# Patient Record
Sex: FEMALE | URBAN - METROPOLITAN AREA
[De-identification: names, ages, dates, MRNs, and addresses within clinical notes are randomized per-mention and may not be internally consistent; named-entity substitution may affect disease eponyms.]

---

## 2021-03-05 ENCOUNTER — APPOINTMENT (RX ONLY)
Dept: URBAN - METROPOLITAN AREA CLINIC 31 | Facility: CLINIC | Age: 36
Setting detail: DERMATOLOGY
End: 2021-03-05

## 2021-03-05 DIAGNOSIS — L21.8 OTHER SEBORRHEIC DERMATITIS: ICD-10-CM

## 2021-03-05 DIAGNOSIS — L85.3 XEROSIS CUTIS: ICD-10-CM

## 2021-03-05 DIAGNOSIS — Z71.89 OTHER SPECIFIED COUNSELING: ICD-10-CM

## 2021-03-05 DIAGNOSIS — D22 MELANOCYTIC NEVI: ICD-10-CM

## 2021-03-05 PROBLEM — D22.62 MELANOCYTIC NEVI OF LEFT UPPER LIMB, INCLUDING SHOULDER: Status: ACTIVE | Noted: 2021-03-05

## 2021-03-05 PROCEDURE — ? ADDITIONAL NOTES

## 2021-03-05 PROCEDURE — 99203 OFFICE O/P NEW LOW 30 MIN: CPT

## 2021-03-05 PROCEDURE — ? PRESCRIPTION

## 2021-03-05 PROCEDURE — ? COUNSELING

## 2021-03-05 RX ORDER — TRIAMCINOLONE ACETONIDE 1 MG/G
CREAM TOPICAL
Qty: 1 | Refills: 2 | Status: ERX | COMMUNITY
Start: 2021-03-05

## 2021-03-05 RX ADMIN — TRIAMCINOLONE ACETONIDE: 1 CREAM TOPICAL at 00:00

## 2021-03-05 ASSESSMENT — LOCATION SIMPLE DESCRIPTION DERM
LOCATION SIMPLE: LEFT EAR
LOCATION SIMPLE: ABDOMEN
LOCATION SIMPLE: LEFT POSTERIOR UPPER ARM
LOCATION SIMPLE: RIGHT EAR

## 2021-03-05 ASSESSMENT — LOCATION DETAILED DESCRIPTION DERM
LOCATION DETAILED: LEFT CYMBA CONCHA
LOCATION DETAILED: EPIGASTRIC SKIN
LOCATION DETAILED: LEFT DISTAL POSTERIOR UPPER ARM
LOCATION DETAILED: RIGHT CYMBA CONCHA

## 2021-03-05 ASSESSMENT — LOCATION ZONE DERM
LOCATION ZONE: EAR
LOCATION ZONE: TRUNK
LOCATION ZONE: ARM

## 2021-03-05 NOTE — PROCEDURE: ADDITIONAL NOTES
Render Risk Assessment In Note?: no
Additional Notes: Neutrogena Norwegian hand cream, Cerave body cream, especially after swimming or hot tub.
Detail Level: Simple

## 2021-03-05 NOTE — HPI: EVALUATION OF SKIN LESION(S)
What Type Of Note Output Would You Prefer (Optional)?: Standard Output
Have Your Spot(S) Been Treated In The Past?: has not been treated
Hpi Title: Evaluation of a Skin Lesion
Additional History: Patient states that it has been there all her life.
Year Removed: 1900

## 2022-07-21 ENCOUNTER — GYNECOLOGY VISIT (OUTPATIENT)
Dept: OBGYN | Facility: CLINIC | Age: 37
End: 2022-07-21
Payer: COMMERCIAL

## 2022-07-21 VITALS — WEIGHT: 225 LBS | SYSTOLIC BLOOD PRESSURE: 126 MMHG | DIASTOLIC BLOOD PRESSURE: 83 MMHG

## 2022-07-21 DIAGNOSIS — N39.3 SUI (STRESS URINARY INCONTINENCE, FEMALE): ICD-10-CM

## 2022-07-21 LAB
APPEARANCE UR: CLEAR
BILIRUB UR STRIP-MCNC: NORMAL MG/DL
COLOR UR AUTO: YELLOW
GLUCOSE UR STRIP.AUTO-MCNC: NEGATIVE MG/DL
KETONES UR STRIP.AUTO-MCNC: NORMAL MG/DL
LEUKOCYTE ESTERASE UR QL STRIP.AUTO: NEGATIVE
NITRITE UR QL STRIP.AUTO: NEGATIVE
PH UR STRIP.AUTO: 5.5 [PH] (ref 5–8)
PROT UR QL STRIP: NORMAL MG/DL
RBC UR QL AUTO: NORMAL
SP GR UR STRIP.AUTO: >=1.03
UROBILINOGEN UR STRIP-MCNC: NORMAL MG/DL

## 2022-07-21 PROCEDURE — 81002 URINALYSIS NONAUTO W/O SCOPE: CPT | Performed by: STUDENT IN AN ORGANIZED HEALTH CARE EDUCATION/TRAINING PROGRAM

## 2022-07-21 PROCEDURE — 51725 SIMPLE CYSTOMETROGRAM: CPT | Performed by: STUDENT IN AN ORGANIZED HEALTH CARE EDUCATION/TRAINING PROGRAM

## 2022-07-21 PROCEDURE — 99204 OFFICE O/P NEW MOD 45 MIN: CPT | Mod: 25 | Performed by: STUDENT IN AN ORGANIZED HEALTH CARE EDUCATION/TRAINING PROGRAM

## 2022-07-21 RX ORDER — ALBUTEROL SULFATE 90 UG/1
2 AEROSOL, METERED RESPIRATORY (INHALATION) EVERY 6 HOURS PRN
COMMUNITY

## 2022-07-21 RX ORDER — FLUTICASONE PROPIONATE AND SALMETEROL 250; 50 UG/1; UG/1
1 POWDER RESPIRATORY (INHALATION) 2 TIMES DAILY
COMMUNITY

## 2022-07-21 NOTE — PROGRESS NOTES
Labs ordered     Lisa Dey MD Urogynecology and Pelvic Reconstructive Surgery Consultation Visit    Sherri Martinez [id number] :1985    Referred by: Yesika Coon MD    Reason for Visit:   Chief Complaint   Patient presents with   • New Patient         Subjective     History of Presenting Illness:    Ms.Melissa Martinez is a 37 y.o. year old P3 who was referred by her GYN Dr. Coon for the evaluation and management of urinary incontinence.     She is working with Dr. Negrete on management of her heavy menstrual bleeding, and is planned for laparoscopic hysterectomy, but she also has significant urinary incontinence would like to have this treated at the same time if possible.    She is bothered for some time by urinary leakage, mostly with cough, laugh, exercise.  This is moderate volume and she has to use pads throughout the day.  This started with the birth of her children, but is worsening.    Prior Pelvic surgery:   none     Prior treatment:   none     Fluid intake:   3 cups water  1 cup coffee    Pelvic floor symptom review:     Bladder:   Voids per day: 7 Voids per night: 1      Urinary incontinence episodes per day: 4-5    Urge leakage:  On Movement to Bathroom   Stress leakage: With Cough, With Laugh and With Exercise   Continuous / insensible urine loss: No    Nocturnal enuresis: No    Leakage volume: Moderate   Number of pads/day: 1-2    Bladder emptying: Incomplete   Voiding symptoms: None   UTI in last 12 months: No   Other urologic history: none      Prolapse:     Prolapse symptoms: None      Bowel:    No bowel issues      Sexual function:    Sexually active: Yes   Gender of partners: Male   Pain with intercourse: No       Pelvic Pain: No      Past medical and surgical history    Past obstetric history   Number of vaginal deliveries: 3   Number of  deliveries: 0   History of vacuum/forceps: No    History of obstetric anal sphincter injury: No     Past medical history:  Past Medical History:   Diagnosis Date   •  Asthma      Past surgical history:No past surgical history on file.  Medications:has a current medication list which includes the following prescription(s): albuterol and fluticasone-salmeterol.  Allergies:Penicillins  Family history:  Family History   Problem Relation Age of Onset   • Blood Disease Maternal Aunt      Social history: reports that she has never smoked. She has never used smokeless tobacco. She reports current alcohol use. She reports that she does not use drugs.    Review of systems: A full review of systems was performed, and negative with the exception of want is noted above in the HPI.        Objective        /83   Wt 102 kg (225 lb)     Physical Exam  Vitals reviewed. Exam conducted with a chaperone present (MA - see notes.).   Constitutional:       Appearance: Normal appearance. She is obese.   HENT:      Head: Normocephalic.      Mouth/Throat:      Mouth: Mucous membranes are moist.   Cardiovascular:      Rate and Rhythm: Normal rate.   Pulmonary:      Effort: Pulmonary effort is normal.   Abdominal:      Palpations: Abdomen is soft. There is no mass.      Tenderness: There is no abdominal tenderness.   Skin:     General: Skin is warm and dry.   Neurological:      Mental Status: She is alert.   Psychiatric:         Mood and Affect: Mood normal.         Genitourinary:    External female genitalia: WNL   Vulva: WNL   Bulbocavernosus reflex: Intact   Anal wink reflex: Intact   Perineal sensation: WNL   Urethra: Hypermobile   Vagina: WNL   Atrophy: None   Cough stress test: Positive when full (see CMG)    Pelvic floor:    POP-Q: no significant prolapse    Urethral tenderness: No    Bladder/ suprapubic tenderness: No    Levator tenderness: None   Levator muscle tone: Hypotonic   Pelvic floor contraction strength (modified Oxford scale): 1=Flicker   Pelvic floor contraction duration: Brief      Procedure Performed: Simple CMG - see report    Diagnostic test and records review:       Post-void  residual: 1mL, performed by Bladder Scanner    Labs:     Radiology: n/a    Documentation reviewed: Prior EMR Records    Outside records reviewed: 7 pages           Assessment & Plan     Ms.Melissa Martinez is a 37 y.o. year old P3 with stress urinary incontinence, demonstrated on exam today with complete bladder emptying. We discussed my recommendations for further diagnosis and treatment at length today.     1. CONRADO (stress urinary incontinence, female)  Ms. Martinez reports stress urinary incontinence (CONRADO) symptoms. The pathogenesis os CONRADO includes genetic tendency,  aging, menopause and childbirth injuries. I discussed options for management which include both nonsurgical and surgical options.   -   Non-surgical pelvic floor physical therapy and pessary use.   -She is interested in definitive surgical therapy.  I reviewed all possible therapies for stress urinary incontinence with her including retropubic mid urethral sling, urethral bulking, fascial sling, Oneal colposuspension.  I was able to demonstrate stress urinary incontinence today upon bladder backfill, and she had a normal postvoid residual volume.  Uncomfortable moving forward with sling procedure without further urodynamic work-up.  -Given her predominant urethral hypermobility, as well as her BMI, the likely best solution for her long-term would be a retropubic mid urethral sling.  Bulking may be a good option as well, but is not treating the underlying cause and is been shown to be less effective in obese patients.  -She opts to proceed with: Retropubic mid urethral sling, cystourethroscopy, and all indicated procedures to be scheduled in conjunction with hysterectomy with Dr. Negrete.  She was given printed counseling as well as verbal counseling on the risk, benefits, and alternatives of this procedure including bleeding, infection, damage to surrounding organs, 1 to 5% risk of mesh exposure over time, risk of persistent urinary incontinence, risk of  transient urinary retention, risk of durable urinary retention requiring sling loosening or revision.  All questions were answered.                    Julian Villanueva MD, FACOG    Female Pelvic Medicine and Reconstructive Surgery  Department of Obstetrics and Gynecology  [facility]    CC: Shabnam Negrete    This medical record contains text that has been entered with the assistance of computer voice recognition and dictation software.  Therefore, it may contain unintended errors in text, spelling, punctuation, or grammar

## 2022-07-21 NOTE — PROGRESS NOTES
Pt here NP exam consult for stress incontinence  Pt states leaks urine coughing, sneezing, lauging, pt has Hysterectomy being scheduled with Dr. Caden Akers# [phone]  Pharmacy verified

## 2022-07-21 NOTE — PROCEDURES
Procedure Note - Cystometrogram    Procedure: Simple cystometrogram (22164)    Pre-operative diagnosis: Stress urinary incontinence, negative empty CST    Verbal consent was obtained after review of risk and benefit.     Chaperone: Antoinette Gregory    Procedure: The patient was placed in the lithotomy position in the exam. She underwent sterile prep with betadine prior to catheterization. There was a negative urinalysis.  A 10F straight catheter was easily placed into the bladder.  With a postvoid residual of 10.  The bladder was slowly filled against gravity with sterile water, until capacity was reached    Findings:    - First sensation: 45 mL  - First desire: 150 mL  - Strong Desire: 250 mL  - CMG capacity: 310 mL  - Stress leakage:   o Empty: neg  o 150mL: neg  o Capacity: positive  - Leakage with DO: no  - Uninhibited detrusor contractions present: no    Assessment:   - CONRADO confirmed with normal PVR    Plan:   - See corresponding E/M visit for full surgical plan.       Julian Villanueva MD, FACOG    Female Pelvic Medicine and Reconstructive Surgery  Department of Obstetrics and Gynecology  [facility]  [facility]

## 2022-07-21 NOTE — PATIENT INSTRUCTIONS
Pre-op: What you should know before your surgery  Mid-urethral sling for stress urinary incontinence    What you should know before your surgery    You are scheduled for surgery to fix your stress urinary incontinence using a small piece of lightweight mesh. This surgery is minimally-invasive, meaning that all incisions are small and hidden inside the vagina. The documents in this packet will outline each part of the surgery.     Goals of surgery: To improve leakage of urine that occurs with increased abdominal pressure, such as from coughing, sneezing, laughing, exercise, sex. Long-term, 70-80% of women report satisfactory symptom control after a sling procedure. Your personal risk of recurrence/retreatment is based on multiple factors including, genetics, urgency symptoms, body weight, smoking, frequent heavy lifting, and future vaginal deliveries.     Sexual function: Studies have not shown a significant change in sexual function after a sling procedure. However, surgery involves the cutting and re-sewing of the vaginal tissues. Scar tissue formation after surgery can lead to painful sex for some patients. In many patients, this new pain goes away over time or can be improved with pelvic physical therapy, lubrication, dilators, or vaginal estrogen.     Bladder urgency after surgery:  Symptoms of bladder urgency (feeling of having to rush to the bathroom) and frequency (urinating very often) can change after sling surgery. This varies among patients. Sling surgery can lead to urgency/frequency getting better, getting worse, or staying the same as before surgery.  We will follow your symptoms after surgery, and develop a treatment plan if this turns into a bothersome problem.    Risk of postoperative pain:  Pain after surgery is a normal part of the healing process, but usually well-tolerated. Common areas of pain include the pelvis and vagina, often related to positioning. Try changing your position when sitting  or resting in bed to relieve the pain. Expect to have some pain when you are discharged home. This should improve with time and with use of medications. See “after surgery” instructions for more details on pain control.     General risks of pelvic reconstructive surgery: Specific risks for your procedure are discussed separately    Anesthesia: With modern anesthetics and monitoring equipment, complications due to anesthesia are very rare. Your anesthesiologist will discuss what will be most suitable for you on the day of surgery  Bleeding: all surgery leads to bleeding, however this surgery usually amounts to blood loss between a tablespoon and a small cup.  Large amount of blood loss that require a blood transfusion are not common in sling surgery (<1%).  Blood transfusion, if needed, is safe. Minor reactions like fever or allergy occur in less than 1% of transfusions. Risks of an infection or mismatched blood is very rare (less then 1 out of every 100,000 transfusions)  Infection: The most common infection with prolapse surgery is a urinary tract infection (UTI) which is easily treated. Infection of the incisions is very uncommon. Risk factors for infections and wound complications include diabetes, smoking, obesity and other chronic illnesses.  Blood Clots: Clots in the blood vessels of the legs/lungs are potentially dangerous and can occur in patients undergoing this type of surgery. This is prevented with leg compression during surgery, and sometimes an injected blood thinner. Staying mobile after surgery is strongly encouraged in all patients, and is  important in preventing clots.  Damage to surrounding organs. The pelvic organs are all very close together. The risk of damage to other organs increases if you have had prior pelvic surgeries, or a prior history of endometriosis, or pelvic infection. These all lead to formation of scar tissue in the pelvis which can cause organs to stick together, making the  surgery more difficult.    Ureter and bladder damage: The ureters are tubes that carry urine from the kidneys to the bladder, and they travel very close to the uterus and vagina. The bladder is attached to both your uterus and the front of the vagina. Damage can happen during a hysterectomy (1-2% chance), or during prolapse repair procedures. A cystoscopy (camera in the bladder) is performed during your surgery to ensure there is no bladder or ureter damage. If injury occurs, it  may require a stent (drainage tube) to be placed temporarily, or in rare cases require a larger abdominal incision to repair. A bladder catheter may need to stay in place temporarily after surgery.  Bowel damage: This is uncommon during your surgery.   Vascular damage: major damage to blood vessels is uncommon. If this occurs it may require a larger surgery and/or blood transfusion.  Fistula: A fistula is an abnormal connection between the vagina, and either the bladder or rectum, leading to leakage of urine or stool. These are rare complications that arise during healing from pelvic surgery, and sometimes require additional surgeries to correct. Great care is made during your surgery to prevent these fistulas. If they do occur, your Urogynecologist is the leading expert in repairing fistulas.        Main Surgical Procedure:    Midurethral sling    (mesh sling under urethra to stop leakage)    The mid-urethral sling procedure will treat the problem of leakage of urine when you experience an increase in abdominal pressure, such as with coughing, sneezing, exercising, or laughing (stress incontinence). This procedure may also be performed if there is a high likelihood that you will develop new stress incontinence after a prolapse repair.    After you go to sleep, the lightweight mesh sling is placed through a small cut made in the vagina over the mid-point of the urethra. Through this cut, the two ends of the sling are passed with a special  needle from the vagina, along either side of the urethra, exiting through two very small cuts made just above the pubic bone in the hairline (retropubic sling). Sometimes, the sling is passed through the groin area (trans-obturator sling). The surgeon will then use a camera (cystoscope) to check that the sling is correctly positioned and not sitting within the bladder. The sling is then adjusted so that it sits loosely underneath the urethra. The vaginal skin is then stitched to cover the sling. The ends of the sling are cut off below the skin and the incision closed with skin glue or bandages.    This urinary incontinence mesh is not the same kind of vaginal mesh you may have heard about on television, which was removed from the market.  Your surgeon will speak to you in detail about this safer sling mesh at your visit.      Risks of this particular procedure include:  Difficulty emptying your bladder requiring a catheter is common immediately after surgery (20-50%). This usually resolves within 1 week. Most patients who can't urinate immediately after surgery will return to the office in 3-5 days to test the bladder again and remove the catheter. Longer-term difficulty passing urine may occur in 1-5% of patients. During this time your doctor may recommend a fine tube or catheter be used to drain the bladder. If your urine stream remains very slow or you cannot empty the bladder well even after the swelling has settled, your provider will discuss other possibilities, such as cutting or loosening the sling.  Sling exposure. Occasionally the sling can appear in the wall of the vagina a few weeks, months, or years after an operation. Symptoms may include vaginal bleeding, vaginal discharge, or pain with intercourse for the patient or her partner. In such cases, you should consult your surgeon for further advice. Management would involve either vaginal estrogen cream to help the skin grow over the sling, or cutting out  the small part of the mesh coming through.  Rarely does the entire vaginal portion of the mesh need to be removed.  The arms of the mesh in the muscles cannot always be removed.  The risk of the mesh coming out into the vagina is 2-4%.  Bladder or urethral perforation. Bladder perforation occurs in 1-5% of procedures. Your surgeon will check for damage during the operation by looking inside the bladder and urethra using a cystoscope, a special camera placed into the bladder. Removing and correctly locating the needle to which the sling is attached should resolve the situation. Bladder perforation, as long as it is recognized, does not affect the success of the operation. Damage to the urethra is more difficult to deal with and should be discussed with your surgeon. This is uncommon, and the sling may not be placed if this occurs.  Pain. Long-term pain following sling surgery is unusual. Studies suggest that after the operation about 1% will develop vaginal or groin pain. In most cases pain is short lived and does not occur for more than 1 to 2 weeks. In the rare instance that the pain does not go away with physical therapy or vaginal estrogen,  the sling may need to be removed.        Post-op: What to expect after your surgery        Recovery room  You can expect to stay in the recovery room for a few hours until you are alert. Some pain is normal after surgery but should be tolerable. Your pain will become less over the first 2 weeks. You will go home on the same day as your surgery.     Bladder function  You will wake up with a small tube (catheter) in your bladder. In some patients, a temporary catheter may need to remain in place after surgery. You will be scheduled for a follow up visit with a nurse for a bladder test a few days after your surgery. An antibiotic may be prescribed while the catheter is in place to decrease the risk of infection.    The sling is designed to stop leakage of urine when you strain.  It is important after surgery not to try to empty your bladder by straining or pushing. You may need to change your position (such as leaning forward or backward) to empty your bladder.     Call the surgeon for treatment, if you have signs and symptoms of a urinary tract infection, including:  Burning with urination  Bladder pain  Worsening need to urinate right away  Urine with a bad smell    Vaginal care   Do not go swimming, take sitting baths, and have sexual intercourse  for 6 weeks after surgery. Do not place anything in your vagina except vaginal estrogen cream, if instructed to do so by your surgeon.    Vaginal discharge and bleeding/spotting is also normal through the entire 6-week recovery. Sometimes small sutures will fall out of your vagina as they dissolve. This is normal. Contact the office with any heavy bleeding that soaks through pads, bad smelling discharge, or worsening pain.    Pain management   Surgical pain is controlled in most patients with only acetaminophen (Tylenol) and non-steroidal (NSAIDs) such as ibuprofen (Advil). These drugs can be taken together because they do not interact with each other. In the hospital, your nurse will give you these medications at regular intervals to both treat and to prevent pain. If these are not controlling your pain, you may ask the nurse for additional medication. When you go home, you will also take NSAIDs and acetaminophen for pain management. Sometimes, a short course of narcotics (oxycodone, hydromorphone) is required. We do not recommend using narcotics regularly as it can lead to constipation or dizziness and falls.     Do not drive or operate heavy machinery while using narcotics. You are unlikely to become addicted if you need to take a narcotic medication a few times within the first week of your surgery.  After the first few days to one week, your pain should decrease and you should not have pain severe enough to need narcotics. If you continue  having severe pain, contact your surgeon for re-evaluation.     Incision care  The small incisions above your pubic bone or in the groin, will be closed with either small bandages or a surgical glue.     You can shower with these in place. In shower, let the soapy warm water run over you incisions. Do not scrub or wipe you incisions. Keep the incisions dry for the remainder of the day/night. The bandages will fall off on their own, or you can remove them after at least 3 days if they become discolored or dirty. The glue will also fall off on its own after a few weeks.     Call us if you feel increasing pain or warmth at the incision. Also, call us if you see increased redness or discharge (pus) at the incision.    Activity restrictions  During the first 6 weeks, avoid any type of heavy lifting that requires straining. Gentle walking is good exercise. Start with about 10 minutes a day when you feel ready and build up gradually. Avoid repetitive squatting or bending at the waist. Avoid any fitness-type training, aerobics, etc. for at least 6 weeks after surgery. Generally, you will need a few days off work. This period may be longer if you have a very physical job or if specifically recommended by your surgeon.     Return to sex  When your surgeon clears you to resume sex (if desired), begin slowly and use enough lubrication to help ease the discomfort. As scar tissue softens over time, you will feel less discomfort. If discomfort does not go away, contact the office to schedule an exam and discuss other options. In some cases, your surgeon may prescribe a low dose of vaginal estrogen to help with vaginal dryness and pain that may happen with sex

## 2022-08-08 ENCOUNTER — PRE-ADMISSION TESTING (OUTPATIENT)
Dept: ADMISSIONS | Facility: MEDICAL CENTER | Age: 37
End: 2022-08-08
Attending: OBSTETRICS & GYNECOLOGY
Payer: COMMERCIAL

## 2022-08-15 ENCOUNTER — PRE-ADMISSION TESTING (OUTPATIENT)
Dept: ADMISSIONS | Facility: MEDICAL CENTER | Age: 37
End: 2022-08-15
Attending: OBSTETRICS & GYNECOLOGY
Payer: COMMERCIAL

## 2022-08-15 DIAGNOSIS — Z01.812 PRE-OPERATIVE LABORATORY EXAMINATION: ICD-10-CM

## 2022-08-15 LAB
ANION GAP SERPL CALC-SCNC: 12 MMOL/L (ref 7–16)
BUN SERPL-MCNC: 12 MG/DL (ref 8–22)
CALCIUM SERPL-MCNC: 8.8 MG/DL (ref 8.5–10.5)
CHLORIDE SERPL-SCNC: 102 MMOL/L (ref 96–112)
CO2 SERPL-SCNC: 24 MMOL/L (ref 20–33)
CREAT SERPL-MCNC: 0.32 MG/DL (ref 0.5–1.4)
ERYTHROCYTE [DISTWIDTH] IN BLOOD BY AUTOMATED COUNT: 42.5 FL (ref 35.9–50)
GFR SERPLBLD CREATININE-BSD FMLA CKD-EPI: 139 ML/MIN/1.73 M 2
GLUCOSE SERPL-MCNC: 108 MG/DL (ref 65–99)
HCT VFR BLD AUTO: 39.9 % (ref 37–47)
HGB BLD-MCNC: 13.1 G/DL (ref 12–16)
MCH RBC QN AUTO: 30 PG (ref 27–33)
MCHC RBC AUTO-ENTMCNC: 32.8 G/DL (ref 33.6–35)
MCV RBC AUTO: 91.5 FL (ref 81.4–97.8)
PLATELET # BLD AUTO: 244 K/UL (ref 164–446)
PMV BLD AUTO: 10.4 FL (ref 9–12.9)
POTASSIUM SERPL-SCNC: 4 MMOL/L (ref 3.6–5.5)
RBC # BLD AUTO: 4.36 M/UL (ref 4.2–5.4)
SODIUM SERPL-SCNC: 138 MMOL/L (ref 135–145)
WBC # BLD AUTO: 8.8 K/UL (ref 4.8–10.8)

## 2022-08-15 PROCEDURE — 85027 COMPLETE CBC AUTOMATED: CPT

## 2022-08-15 PROCEDURE — 36415 COLL VENOUS BLD VENIPUNCTURE: CPT

## 2022-08-15 PROCEDURE — 80048 BASIC METABOLIC PNL TOTAL CA: CPT

## 2022-08-23 NOTE — H&P
GYN H&P:  Preoperative visit in office completed 8/15/2022      CC: heavy menses    HPI:  Ms. Martinez is a 37-year-old -0-0-3 here for desired definitive surgical management for abnormal uterine bleeding and dysmenorrhea.  Monthly menses lasting up to 7 days with large clots.  Has been on a progestin only pill but has not really helped, has had some mildly elevated blood pressures in the past, although does not have a diagnosis of hypertension, so was not given combined hormonal contraceptives.  Patient did have an ultrasound showing a normal retroverted uterus measuring 8.4 x 5.6 x 5.6 cm.  The bilateral adnexa were normal.  Patient has had a CBC and iron studies both of which were within normal limits.  She did have an endometrial biopsy which was normal, showing only proliferative endometrium.  Patient also with complaints of stress urinary incontinence for which she has been seen by Dr. Villanueva with plans to do a mid urethral sling at the time of her hysterectomy.  Patient has had a full discussion of all options for heavy menstrual bleeding and declined additional hormonal options including Mirena IUD, Depo-Provera, systemic progesterone.  She prefers to avoid with definitive surgical management and is not interested in trying an ablation at this time.  Patient does have a history of cardiac ablation for arrhythmia with no problems since that time.  Reports she was on blood thinners for short period of time until the ablation was completed but then was cleared completely by cardiology.    Pt's partner is s/p vasectomy for pregnancy prevention    ROS:  10 system ROS performed and negative    OB History    Para Term  AB Living   3 3 3     3   SAB IAB Ectopic Molar Multiple Live Births             3      # Outcome Date GA Lbr Jonathan/2nd Weight Sex Delivery Anes PTL Lv   3 Term 12 39w0d  3.303 kg (7 lb 4.5 oz) F Vag-Spont  N LETICIA   2 Term 06 41w0d  3.515 kg (7 lb 12 oz) F Vag-Spont  N LETICIA   1  Term 03 41w0d  3.147 kg (6 lb 15 oz) F Vag-Spont  N LETICIA       GYN: 14/monthly/7 days  denies STIs, denies abnormal Paps.  Last Pap 3/30/2021 NILM/HPV neg    Past Medical History:   Diagnosis Date    Arrhythmia     afib    Asthma        Past Surgical History:   Procedure Laterality Date    OTHER CARDIAC SURGERY N/A     cardiac ablation       No current facility-administered medications on file prior to encounter.     Current Outpatient Medications on File Prior to Encounter   Medication Sig Dispense Refill    albuterol 108 (90 Base) MCG/ACT Aero Soln inhalation aerosol Inhale 2 Puffs every 6 hours as needed for Shortness of Breath.      fluticasone-salmeterol (ADVAIR DISKUS) 250-50 MCG/ACT AEROSOL POWDER, BREATH ACTIVATED Inhale 1 Puff 2 times a day.         FamHx: HTN, DM, breast ca in maternal aunt      SocHx: denies tob/drugs/EtOH    PE:  132/86, 224.8lbs  gen: AAO, NAD  Heart: RRR  Resp: ctab  abd: soft, NT, no masses  Ext: NT, no edema        A/P: 37 y.o.  with abnormal uterine bleeding/heavy menstrual bleeding and dysmenorrhea as well as stress urinary incontinence here for desired definitive surgical management    Patient declines any further medical management and desires to proceed with hysterectomy and con commitment mid urethral sling placement by Dr. Villanueva.  Pt is not desiring of any future childbearing.    Plan for total vaginal hysterectomy, bilateral salpingectomy, cystoscopy, retropubic mid urethral sling.    Discussed the possible possibility that salpingectomy would not be able to be performed depending on intraoperative findings if removing the tubes was felt to increase the risk of surgery.  Patient expressed understanding.  Detailed discussion of risks of mid urethral sling previously discussed with Dr. Villanueva, patient is understanding she need to go home with a Gamez catheter.  In office we discussed in detail the risks of surgery including pain, bleeding, infection.  We did review the  risk of damage to surrounding structures including bowel, bladder, ureters as well as risk of conversion to open surgical procedure.  Patient confirms she is excepting of blood transfusion in case of emergency.  We reviewed the risks of blood transfusion including transfusion reaction (which can cause damage to the heart, lungs, kidneys) as well as risk of exposure to infectious disease including HIV and hepatitis.  All questions answered.        Yesika White MD  [facility]

## 2022-08-24 NOTE — H&P
Urogynecology and Pelvic Reconstructive Surgery Consultation Visit    Sherri Martinez MRN:[id number] :1985    Referred by: Yesika Coon MD    Reason for Visit:   Chief Complaint   Patient presents with    New Patient         Subjective     History of Presenting Illness:    Ms.Melissa Martinez is a 37 y.o. year old P3 who was referred by her GYN Dr. Caden Coon for the evaluation and management of urinary incontinence, for sling procedure today.    She is bothered for some time by urinary leakage, mostly with cough, laugh, exercise.  This is moderate volume and she has to use pads throughout the day.  This started with the birth of her children, but is worsening.    Prior Pelvic surgery:   none     Prior treatment:   none     Fluid intake:   3 cups water  1 cup coffee    Pelvic floor symptom review:     Bladder:   Voids per day: 7 Voids per night: 1      Urinary incontinence episodes per day: 4-5    Urge leakage:  On Movement to Bathroom   Stress leakage: With Cough, With Laugh and With Exercise   Continuous / insensible urine loss: No    Nocturnal enuresis: No    Leakage volume: Moderate   Number of pads/day: 1-2    Bladder emptying: Incomplete   Voiding symptoms: None   UTI in last 12 months: No   Other urologic history: none      Prolapse:     Prolapse symptoms: None      Bowel:    No bowel issues      Sexual function:    Sexually active: Yes   Gender of partners: Male   Pain with intercourse: No       Pelvic Pain: No      Past medical and surgical history    Past obstetric history   Number of vaginal deliveries: 3   Number of  deliveries: 0   History of vacuum/forceps: No    History of obstetric anal sphincter injury: No     Past medical history:  Past Medical History:   Diagnosis Date    Asthma      Past surgical history:No past surgical history on file.  Medications:has a current medication list which includes the following prescription(s): albuterol and  fluticasone-salmeterol.  Allergies:Penicillins  Family history:  Family History   Problem Relation Age of Onset    Blood Disease Maternal Aunt      Social history: reports that she has never smoked. She has never used smokeless tobacco. She reports current alcohol use. She reports that she does not use drugs.    Review of systems: A full review of systems was performed, and negative with the exception of want is noted above in the HPI.        Objective        /83   Wt 102 kg (225 lb)     Physical Exam  Vitals reviewed. Exam conducted with a chaperone present (MA - see notes.).   Constitutional:       Appearance: Normal appearance. She is obese.   HENT:      Head: Normocephalic.      Mouth/Throat:      Mouth: Mucous membranes are moist.   Cardiovascular:      Rate and Rhythm: Normal rate.   Pulmonary:      Effort: Pulmonary effort is normal.   Abdominal:      Palpations: Abdomen is soft. There is no mass.      Tenderness: There is no abdominal tenderness.   Skin:     General: Skin is warm and dry.   Neurological:      Mental Status: She is alert.   Psychiatric:         Mood and Affect: Mood normal.       Genitourinary:    External female genitalia: WNL   Vulva: WNL   Bulbocavernosus reflex: Intact   Anal wink reflex: Intact   Perineal sensation: WNL   Urethra: Hypermobile   Vagina: WNL   Atrophy: None   Cough stress test: Positive when full (see CMG)    Pelvic floor:    POP-Q: no significant prolapse    Urethral tenderness: No    Bladder/ suprapubic tenderness: No    Levator tenderness: None   Levator muscle tone: Hypotonic   Pelvic floor contraction strength (modified Oxford scale): 1=Flicker   Pelvic floor contraction duration: Brief      Procedure Performed: Simple CMG - see report    Diagnostic test and records review:       Post-void residual: 1mL, performed by Bladder Scanner    Labs:     Radiology: n/a    Documentation reviewed: Prior EMR Records    Outside records reviewed: 7 pages           Assessment  & Plan     Ms.Melissa Martinez is a 37 y.o. year old P3 with stress urinary incontinence, demonstrated on exam today with complete bladder emptying, for retropubic midurethral sling procedure.    1. CONRADO (stress urinary incontinence, female)  Ms. Martinez reports stress urinary incontinence (CONRADO) symptoms. The pathogenesis os CONRADO includes genetic tendency,  aging, menopause and childbirth injuries. I discussed options for management which include both nonsurgical and surgical options.   -   Non-surgical pelvic floor physical therapy and pessary use.   -She is interested in definitive surgical therapy.  I reviewed all possible therapies for stress urinary incontinence with her including retropubic mid urethral sling, urethral bulking, fascial sling, Oneal colposuspension.  I was able to demonstrate stress urinary incontinence upon bladder backfill, and she had a normal postvoid residual volume.  Uncomfortable moving forward with sling procedure without further urodynamic work-up.  -Given her predominant urethral hypermobility, as well as her BMI, the likely best solution for her long-term would be a retropubic mid urethral sling.  Bulking may be a good option as well, but is not treating the underlying cause and is been shown to be less effective in obese patients.  -She opts to proceed with: Retropubic mid urethral sling, cystourethroscopy, and all indicated procedures to be scheduled in conjunction with hysterectomy with Dr. Negrete.  She was given printed counseling as well as verbal counseling on the risk, benefits, and alternatives of this procedure including bleeding, infection, damage to surrounding organs, 1 to 5% risk of mesh exposure over time, risk of persistent urinary incontinence, risk of transient urinary retention, risk of durable urinary retention requiring sling loosening or revision.  All questions were answered.                    Julian Villanueva MD, FACOG  Female Pelvic Medicine and Reconstructive  Surgery  Department of Obstetrics and Gynecology  [facility]      This medical record contains text that has been entered with the assistance of computer voice recognition and dictation software.  Therefore, it may contain unintended errors in text, spelling, punctuation, or grammar

## 2022-08-25 ENCOUNTER — HOSPITAL ENCOUNTER (OUTPATIENT)
Facility: MEDICAL CENTER | Age: 37
End: 2022-08-25
Attending: OBSTETRICS & GYNECOLOGY | Admitting: OBSTETRICS & GYNECOLOGY
Payer: COMMERCIAL

## 2022-08-25 ENCOUNTER — ANESTHESIA (OUTPATIENT)
Dept: SURGERY | Facility: MEDICAL CENTER | Age: 37
End: 2022-08-25
Payer: COMMERCIAL

## 2022-08-25 ENCOUNTER — PHARMACY VISIT (OUTPATIENT)
Dept: PHARMACY | Facility: MEDICAL CENTER | Age: 37
End: 2022-08-25
Payer: COMMERCIAL

## 2022-08-25 ENCOUNTER — ANESTHESIA EVENT (OUTPATIENT)
Dept: SURGERY | Facility: MEDICAL CENTER | Age: 37
End: 2022-08-25
Payer: COMMERCIAL

## 2022-08-25 VITALS
TEMPERATURE: 98 F | RESPIRATION RATE: 14 BRPM | WEIGHT: 224.43 LBS | DIASTOLIC BLOOD PRESSURE: 70 MMHG | SYSTOLIC BLOOD PRESSURE: 132 MMHG | OXYGEN SATURATION: 93 % | HEIGHT: 62 IN | BODY MASS INDEX: 41.3 KG/M2 | HEART RATE: 83 BPM

## 2022-08-25 DIAGNOSIS — Z98.890 POSTOPERATIVE STATE: ICD-10-CM

## 2022-08-25 PROBLEM — N94.6 DYSMENORRHEA: Status: ACTIVE | Noted: 2022-08-25

## 2022-08-25 PROBLEM — N93.9 ABNORMAL UTERINE BLEEDING (AUB): Status: ACTIVE | Noted: 2022-08-25

## 2022-08-25 LAB
EST. AVERAGE GLUCOSE BLD GHB EST-MCNC: 111 MG/DL
HBA1C MFR BLD: 5.5 % (ref 4–5.6)
HCG UR QL: NEGATIVE
PATHOLOGY CONSULT NOTE: NORMAL

## 2022-08-25 PROCEDURE — 58260 VAGINAL HYSTERECTOMY: CPT | Performed by: STUDENT IN AN ORGANIZED HEALTH CARE EDUCATION/TRAINING PROGRAM

## 2022-08-25 PROCEDURE — 700102 HCHG RX REV CODE 250 W/ 637 OVERRIDE(OP): Performed by: STUDENT IN AN ORGANIZED HEALTH CARE EDUCATION/TRAINING PROGRAM

## 2022-08-25 PROCEDURE — 57288 REPAIR BLADDER DEFECT: CPT | Performed by: STUDENT IN AN ORGANIZED HEALTH CARE EDUCATION/TRAINING PROGRAM

## 2022-08-25 PROCEDURE — 36415 COLL VENOUS BLD VENIPUNCTURE: CPT

## 2022-08-25 PROCEDURE — 110454 HCHG SHELL REV 250: Performed by: OBSTETRICS & GYNECOLOGY

## 2022-08-25 PROCEDURE — 160035 HCHG PACU - 1ST 60 MINS PHASE I: Performed by: OBSTETRICS & GYNECOLOGY

## 2022-08-25 PROCEDURE — 700102 HCHG RX REV CODE 250 W/ 637 OVERRIDE(OP): Performed by: ANESTHESIOLOGY

## 2022-08-25 PROCEDURE — 160009 HCHG ANES TIME/MIN: Performed by: OBSTETRICS & GYNECOLOGY

## 2022-08-25 PROCEDURE — 83036 HEMOGLOBIN GLYCOSYLATED A1C: CPT

## 2022-08-25 PROCEDURE — 160048 HCHG OR STATISTICAL LEVEL 1-5: Performed by: OBSTETRICS & GYNECOLOGY

## 2022-08-25 PROCEDURE — 88307 TISSUE EXAM BY PATHOLOGIST: CPT

## 2022-08-25 PROCEDURE — 160025 RECOVERY II MINUTES (STATS): Performed by: OBSTETRICS & GYNECOLOGY

## 2022-08-25 PROCEDURE — 160041 HCHG SURGERY MINUTES - EA ADDL 1 MIN LEVEL 4: Performed by: OBSTETRICS & GYNECOLOGY

## 2022-08-25 PROCEDURE — 160029 HCHG SURGERY MINUTES - 1ST 30 MINS LEVEL 4: Performed by: OBSTETRICS & GYNECOLOGY

## 2022-08-25 PROCEDURE — 160036 HCHG PACU - EA ADDL 30 MINS PHASE I: Performed by: OBSTETRICS & GYNECOLOGY

## 2022-08-25 PROCEDURE — C1771 REP DEV, URINARY, W/SLING: HCPCS | Performed by: OBSTETRICS & GYNECOLOGY

## 2022-08-25 PROCEDURE — A9270 NON-COVERED ITEM OR SERVICE: HCPCS | Performed by: STUDENT IN AN ORGANIZED HEALTH CARE EDUCATION/TRAINING PROGRAM

## 2022-08-25 PROCEDURE — 00860 ANES XTRPRTL PX LWR ABD NOS: CPT | Performed by: ANESTHESIOLOGY

## 2022-08-25 PROCEDURE — 700105 HCHG RX REV CODE 258: Performed by: OBSTETRICS & GYNECOLOGY

## 2022-08-25 PROCEDURE — 81025 URINE PREGNANCY TEST: CPT

## 2022-08-25 PROCEDURE — A9270 NON-COVERED ITEM OR SERVICE: HCPCS | Performed by: ANESTHESIOLOGY

## 2022-08-25 PROCEDURE — 700111 HCHG RX REV CODE 636 W/ 250 OVERRIDE (IP): Performed by: ANESTHESIOLOGY

## 2022-08-25 PROCEDURE — 160002 HCHG RECOVERY MINUTES (STAT): Performed by: OBSTETRICS & GYNECOLOGY

## 2022-08-25 PROCEDURE — 700101 HCHG RX REV CODE 250: Performed by: ANESTHESIOLOGY

## 2022-08-25 PROCEDURE — 160046 HCHG PACU - 1ST 60 MINS PHASE II: Performed by: OBSTETRICS & GYNECOLOGY

## 2022-08-25 PROCEDURE — 700101 HCHG RX REV CODE 250: Performed by: OBSTETRICS & GYNECOLOGY

## 2022-08-25 PROCEDURE — RXMED WILLOW AMBULATORY MEDICATION CHARGE: Performed by: OBSTETRICS & GYNECOLOGY

## 2022-08-25 DEVICE — SYSTEM TRANSVAGINAL MID-URETHRAL SLING ADVANTAGE FIT: Type: IMPLANTABLE DEVICE | Status: FUNCTIONAL

## 2022-08-25 RX ORDER — DIPHENHYDRAMINE HYDROCHLORIDE 50 MG/ML
12.5 INJECTION INTRAMUSCULAR; INTRAVENOUS
Status: DISCONTINUED | OUTPATIENT
Start: 2022-08-25 | End: 2022-08-25 | Stop reason: HOSPADM

## 2022-08-25 RX ORDER — HYDROMORPHONE HYDROCHLORIDE 1 MG/ML
0.5 INJECTION, SOLUTION INTRAMUSCULAR; INTRAVENOUS; SUBCUTANEOUS
Status: DISCONTINUED | OUTPATIENT
Start: 2022-08-25 | End: 2022-08-25 | Stop reason: HOSPADM

## 2022-08-25 RX ORDER — LABETALOL HYDROCHLORIDE 5 MG/ML
5 INJECTION, SOLUTION INTRAVENOUS
Status: DISCONTINUED | OUTPATIENT
Start: 2022-08-25 | End: 2022-08-25 | Stop reason: HOSPADM

## 2022-08-25 RX ORDER — SODIUM CHLORIDE, SODIUM LACTATE, POTASSIUM CHLORIDE, CALCIUM CHLORIDE 600; 310; 30; 20 MG/100ML; MG/100ML; MG/100ML; MG/100ML
INJECTION, SOLUTION INTRAVENOUS CONTINUOUS
Status: ACTIVE | OUTPATIENT
Start: 2022-08-25 | End: 2022-08-25

## 2022-08-25 RX ORDER — IBUPROFEN 800 MG/1
800 TABLET ORAL EVERY 8 HOURS PRN
Qty: 30 TABLET | Refills: 0 | Status: SHIPPED | OUTPATIENT
Start: 2022-08-25

## 2022-08-25 RX ORDER — MEPERIDINE HYDROCHLORIDE 25 MG/ML
12.5 INJECTION INTRAMUSCULAR; INTRAVENOUS; SUBCUTANEOUS
Status: DISCONTINUED | OUTPATIENT
Start: 2022-08-25 | End: 2022-08-25 | Stop reason: HOSPADM

## 2022-08-25 RX ORDER — ALBUTEROL SULFATE 2.5 MG/3ML
2.5 SOLUTION RESPIRATORY (INHALATION)
Status: DISCONTINUED | OUTPATIENT
Start: 2022-08-25 | End: 2022-08-25 | Stop reason: HOSPADM

## 2022-08-25 RX ORDER — SODIUM CHLORIDE, SODIUM LACTATE, POTASSIUM CHLORIDE, CALCIUM CHLORIDE 600; 310; 30; 20 MG/100ML; MG/100ML; MG/100ML; MG/100ML
INJECTION, SOLUTION INTRAVENOUS CONTINUOUS
Status: DISCONTINUED | OUTPATIENT
Start: 2022-08-25 | End: 2022-08-25 | Stop reason: HOSPADM

## 2022-08-25 RX ORDER — HYDROMORPHONE HYDROCHLORIDE 1 MG/ML
0.4 INJECTION, SOLUTION INTRAMUSCULAR; INTRAVENOUS; SUBCUTANEOUS
Status: DISCONTINUED | OUTPATIENT
Start: 2022-08-25 | End: 2022-08-25 | Stop reason: HOSPADM

## 2022-08-25 RX ORDER — PHENAZOPYRIDINE HYDROCHLORIDE 200 MG/1
200 TABLET, FILM COATED ORAL
Status: COMPLETED | OUTPATIENT
Start: 2022-08-25 | End: 2022-08-25

## 2022-08-25 RX ORDER — ONDANSETRON 2 MG/ML
INJECTION INTRAMUSCULAR; INTRAVENOUS PRN
Status: DISCONTINUED | OUTPATIENT
Start: 2022-08-25 | End: 2022-08-25 | Stop reason: SURG

## 2022-08-25 RX ORDER — BUPIVACAINE HYDROCHLORIDE AND EPINEPHRINE 5; 5 MG/ML; UG/ML
INJECTION, SOLUTION EPIDURAL; INTRACAUDAL; PERINEURAL
Status: DISCONTINUED
Start: 2022-08-25 | End: 2022-08-25 | Stop reason: HOSPADM

## 2022-08-25 RX ORDER — ACETAMINOPHEN 500 MG
1000 TABLET ORAL
Status: COMPLETED | OUTPATIENT
Start: 2022-08-25 | End: 2022-08-25

## 2022-08-25 RX ORDER — HALOPERIDOL 5 MG/ML
1 INJECTION INTRAMUSCULAR
Status: DISCONTINUED | OUTPATIENT
Start: 2022-08-25 | End: 2022-08-25 | Stop reason: HOSPADM

## 2022-08-25 RX ORDER — OXYCODONE HCL 5 MG/5 ML
5 SOLUTION, ORAL ORAL
Status: COMPLETED | OUTPATIENT
Start: 2022-08-25 | End: 2022-08-25

## 2022-08-25 RX ORDER — CEFAZOLIN SODIUM 2 G/100ML
2 INJECTION, SOLUTION INTRAVENOUS
Status: DISCONTINUED | OUTPATIENT
Start: 2022-08-25 | End: 2022-08-25 | Stop reason: HOSPADM

## 2022-08-25 RX ORDER — LIDOCAINE HYDROCHLORIDE 40 MG/ML
SOLUTION TOPICAL PRN
Status: DISCONTINUED | OUTPATIENT
Start: 2022-08-25 | End: 2022-08-25 | Stop reason: SURG

## 2022-08-25 RX ORDER — KETAMINE HYDROCHLORIDE 50 MG/ML
INJECTION, SOLUTION INTRAMUSCULAR; INTRAVENOUS PRN
Status: DISCONTINUED | OUTPATIENT
Start: 2022-08-25 | End: 2022-08-25 | Stop reason: SURG

## 2022-08-25 RX ORDER — KETOROLAC TROMETHAMINE 30 MG/ML
INJECTION, SOLUTION INTRAMUSCULAR; INTRAVENOUS PRN
Status: DISCONTINUED | OUTPATIENT
Start: 2022-08-25 | End: 2022-08-25 | Stop reason: SURG

## 2022-08-25 RX ORDER — MIDAZOLAM HYDROCHLORIDE 1 MG/ML
INJECTION INTRAMUSCULAR; INTRAVENOUS PRN
Status: DISCONTINUED | OUTPATIENT
Start: 2022-08-25 | End: 2022-08-25 | Stop reason: SURG

## 2022-08-25 RX ORDER — VASOPRESSIN 20 U/ML
INJECTION PARENTERAL
Status: DISCONTINUED
Start: 2022-08-25 | End: 2022-08-25 | Stop reason: HOSPADM

## 2022-08-25 RX ORDER — CEFAZOLIN SODIUM 1 G/3ML
INJECTION, POWDER, FOR SOLUTION INTRAMUSCULAR; INTRAVENOUS PRN
Status: DISCONTINUED | OUTPATIENT
Start: 2022-08-25 | End: 2022-08-25 | Stop reason: SURG

## 2022-08-25 RX ORDER — DEXAMETHASONE SODIUM PHOSPHATE 4 MG/ML
INJECTION, SOLUTION INTRA-ARTICULAR; INTRALESIONAL; INTRAMUSCULAR; INTRAVENOUS; SOFT TISSUE PRN
Status: DISCONTINUED | OUTPATIENT
Start: 2022-08-25 | End: 2022-08-25 | Stop reason: HOSPADM

## 2022-08-25 RX ORDER — OXYCODONE HCL 5 MG/5 ML
10 SOLUTION, ORAL ORAL
Status: COMPLETED | OUTPATIENT
Start: 2022-08-25 | End: 2022-08-25

## 2022-08-25 RX ORDER — OXYCODONE HYDROCHLORIDE 5 MG/1
5 TABLET ORAL EVERY 4 HOURS PRN
Qty: 10 TABLET | Refills: 0 | Status: SHIPPED | OUTPATIENT
Start: 2022-08-25 | End: 2022-08-30

## 2022-08-25 RX ORDER — HYDRALAZINE HYDROCHLORIDE 20 MG/ML
5 INJECTION INTRAMUSCULAR; INTRAVENOUS
Status: DISCONTINUED | OUTPATIENT
Start: 2022-08-25 | End: 2022-08-25 | Stop reason: HOSPADM

## 2022-08-25 RX ORDER — HYDROMORPHONE HYDROCHLORIDE 1 MG/ML
0.2 INJECTION, SOLUTION INTRAMUSCULAR; INTRAVENOUS; SUBCUTANEOUS
Status: DISCONTINUED | OUTPATIENT
Start: 2022-08-25 | End: 2022-08-25 | Stop reason: HOSPADM

## 2022-08-25 RX ORDER — ACETAMINOPHEN 500 MG
500-1000 TABLET ORAL EVERY 6 HOURS PRN
Qty: 30 TABLET | Refills: 0 | Status: SHIPPED | OUTPATIENT
Start: 2022-08-25

## 2022-08-25 RX ORDER — ROCURONIUM BROMIDE 10 MG/ML
INJECTION, SOLUTION INTRAVENOUS PRN
Status: DISCONTINUED | OUTPATIENT
Start: 2022-08-25 | End: 2022-08-25 | Stop reason: SURG

## 2022-08-25 RX ORDER — LIDOCAINE HYDROCHLORIDE 20 MG/ML
INJECTION, SOLUTION EPIDURAL; INFILTRATION; INTRACAUDAL; PERINEURAL PRN
Status: DISCONTINUED | OUTPATIENT
Start: 2022-08-25 | End: 2022-08-25 | Stop reason: SURG

## 2022-08-25 RX ADMIN — ONDANSETRON 4 MG: 2 INJECTION INTRAMUSCULAR; INTRAVENOUS at 09:45

## 2022-08-25 RX ADMIN — DEXAMETHASONE SODIUM PHOSPHATE 10 MG: 4 INJECTION, SOLUTION INTRA-ARTICULAR; INTRALESIONAL; INTRAMUSCULAR; INTRAVENOUS; SOFT TISSUE at 07:56

## 2022-08-25 RX ADMIN — PHENAZOPYRIDINE HYDROCHLORIDE 200 MG: 200 TABLET ORAL at 06:39

## 2022-08-25 RX ADMIN — PROPOFOL 200 MG: 10 INJECTION, EMULSION INTRAVENOUS at 07:51

## 2022-08-25 RX ADMIN — OXYCODONE HYDROCHLORIDE 10 MG: 5 SOLUTION ORAL at 11:22

## 2022-08-25 RX ADMIN — KETOROLAC TROMETHAMINE 30 MG: 30 INJECTION, SOLUTION INTRAMUSCULAR at 09:45

## 2022-08-25 RX ADMIN — FENTANYL CITRATE 50 MCG: 50 INJECTION, SOLUTION INTRAMUSCULAR; INTRAVENOUS at 09:25

## 2022-08-25 RX ADMIN — LIDOCAINE HYDROCHLORIDE 100 MG: 20 INJECTION, SOLUTION EPIDURAL; INFILTRATION; INTRACAUDAL at 07:51

## 2022-08-25 RX ADMIN — KETAMINE HYDROCHLORIDE 10 MG: 50 INJECTION INTRAMUSCULAR; INTRAVENOUS at 09:04

## 2022-08-25 RX ADMIN — SUGAMMADEX 200 MG: 100 INJECTION, SOLUTION INTRAVENOUS at 09:57

## 2022-08-25 RX ADMIN — ACETAMINOPHEN 1000 MG: 500 TABLET ORAL at 06:39

## 2022-08-25 RX ADMIN — FENTANYL CITRATE 100 MCG: 50 INJECTION, SOLUTION INTRAMUSCULAR; INTRAVENOUS at 07:51

## 2022-08-25 RX ADMIN — FENTANYL CITRATE 50 MCG: 50 INJECTION, SOLUTION INTRAMUSCULAR; INTRAVENOUS at 08:41

## 2022-08-25 RX ADMIN — FENTANYL CITRATE 50 MCG: 50 INJECTION, SOLUTION INTRAMUSCULAR; INTRAVENOUS at 10:48

## 2022-08-25 RX ADMIN — CEFAZOLIN 2 G: 330 INJECTION, POWDER, FOR SOLUTION INTRAMUSCULAR; INTRAVENOUS at 07:57

## 2022-08-25 RX ADMIN — LIDOCAINE HYDROCHLORIDE 4 ML: 40 SOLUTION TOPICAL at 07:52

## 2022-08-25 RX ADMIN — SODIUM CHLORIDE, POTASSIUM CHLORIDE, SODIUM LACTATE AND CALCIUM CHLORIDE: 600; 310; 30; 20 INJECTION, SOLUTION INTRAVENOUS at 09:15

## 2022-08-25 RX ADMIN — SODIUM CHLORIDE, POTASSIUM CHLORIDE, SODIUM LACTATE AND CALCIUM CHLORIDE: 600; 310; 30; 20 INJECTION, SOLUTION INTRAVENOUS at 07:32

## 2022-08-25 RX ADMIN — ROCURONIUM BROMIDE 50 MG: 10 INJECTION, SOLUTION INTRAVENOUS at 09:09

## 2022-08-25 RX ADMIN — ROCURONIUM BROMIDE 100 MG: 10 INJECTION, SOLUTION INTRAVENOUS at 07:52

## 2022-08-25 RX ADMIN — KETAMINE HYDROCHLORIDE 50 MG: 50 INJECTION INTRAMUSCULAR; INTRAVENOUS at 07:51

## 2022-08-25 RX ADMIN — MIDAZOLAM HYDROCHLORIDE 2 MG: 1 INJECTION, SOLUTION INTRAMUSCULAR; INTRAVENOUS at 07:44

## 2022-08-25 ASSESSMENT — PAIN DESCRIPTION - PAIN TYPE
TYPE: SURGICAL PAIN

## 2022-08-25 NOTE — OR NURSING
1023: Assumed care of patient; report from LALI Lugo. Per report, OPA removed at 1020.   1027: Patient arousing, IZAGUIRRE spontaneously. Dr Michel at bedside checking on patient.    1039: Patient occasionally moaning, reports cramping type pain 4/10, declines meds at this time.   1045: Continues to moan, reporting 7/10 cramping. Repositioned on [facility], heat packs applied to lower abdomen, medicated per eMAR.   1058: Patient reports improved pain, rating it 3/10 currently. Denies more medications at this time.  1115: Mask removed, now on RA. Rating pain 4-5/10. Tolerating sips.   1122: Reports increasing pain, medicated per eMAR.   1123: Dr Villanueva at bedside talking with patient.   1130: Patient resting, reports pain 5/10. No nausea.  1145: Patient ready for voiding trial. 200mL sterile water backfilled via gravity. Gamez removed. Patient ambulated to bathroom, able to void 100-125mL.   1155: Returned to [facility] post void. Pain 4/10. Heat packs reapplied.   1205: Report given to LALI Bardales in Phase 2.   1207: Patient transferred to Phase 2 via [facility]. Belongings and prescription medications transported with patient.

## 2022-08-25 NOTE — ANESTHESIA PROCEDURE NOTES
Airway    Date/Time: 8/25/2022 7:53 AM  Performed by: José Miguel Gilmore M.D.  Authorized by: José Miguel Gilmore M.D.     Location:  OR  Urgency:  Elective  Difficult Airway: No    Indications for Airway Management:  Anesthesia      Spontaneous Ventilation: absent    Sedation Level:  Deep  Preoxygenated: Yes    Patient Position:  Sniffing  Mask Difficulty Assessment:  1 - vent by mask  Final Airway Type:  Endotracheal airway  Final Endotracheal Airway:  ETT  Cuffed: Yes    Technique Used for Successful ETT Placement:  Direct laryngoscopy    Insertion Site:  Oral  Blade Type:  Alonso  Laryngoscope Blade/Videolaryngoscope Blade Size:  2  ETT Size (mm):  7.5  Measured from:  Teeth  ETT to Teeth (cm):  22  Placement Verified by: auscultation and capnometry    Cormack-Lehane Classification:  Grade I - full view of glottis  Number of Attempts at Approach:  1

## 2022-08-25 NOTE — ANESTHESIA POSTPROCEDURE EVALUATION
Patient: Sherri Martinez    Procedure Summary     Date: 08/25/22 Room / Location: [facility] ROOM 27 / SURGERY SAME DAY [facility]    Anesthesia Start: 0742 Anesthesia Stop: 1023    Procedures:       TOTAL VAGINAL HYSTERECTOMY      RETROPUBIC MID URETHRAL SLING, CYSTOURETHROSCOPY AND ALL INDICATED PROCEDURES      CYSTOSCOPY Diagnosis: (ABNORMAL UTERINE BLEEDING, STRESS URINARY INCONTINENCE)    Surgeons: Yesika White M.D.; Julian Villanueva M.D. Responsible Provider: José Miguel Gilmore M.D.    Anesthesia Type: general ASA Status: 3          Final Anesthesia Type: general  Last vitals  BP   Blood Pressure: (!) 147/76    Temp   36.4 °C (97.5 °F)    Pulse   75   Resp   14    SpO2   98 %      Anesthesia Post Evaluation    Patient location during evaluation: PACU  Patient participation: complete - patient participated  Level of consciousness: sleepy but conscious    Airway patency: patent  Anesthetic complications: no  Cardiovascular status: hemodynamically stable  Respiratory status: acceptable  Hydration status: balanced    PONV: none          No notable events documented.     Nurse Pain Score: 7 (NPRS)

## 2022-08-25 NOTE — OP REPORT
Operative Report  08/25/22      PreOp Diagnosis:   Heavy menstrual bleeding  2.  Dysmenorrhea  3.  Stress urinary incontinence      PostOp Diagnosis:   S/p TVH, mid-urethral sling      Procedure(s):  TOTAL VAGINAL HYSTERECTOMY - Wound Class: Clean Contaminated  RETROPUBIC MID URETHRAL SLING, CYSTOURETHROSCOPY AND ALL INDICATED PROCEDURES - Wound Class: Clean Contaminated  CYSTOSCOPY - Wound Class: Clean Contaminated    Surgeon(s):  CHETAN Brown M.D.    Anesthesiologist/Type of Anesthesia:  Anesthesiologist: José Miguel Gilmore M.D./General    Surgical Staff:  Circulator: Fatemeh Lin R.N.  Relief Scrub: Edel Villalta R.N.; Gwen Mccormick  Scrub Person: Verenice Alonso    Specimens removed if any:  ID Type Source Tests Collected by Time Destination   A : Uterus, cervix, bilateral fallopian tubes Tissue Uterus PATHOLOGY SPECIMEN Yesika White M.D. 8/25/2022  8:34 AM        Estimated Blood Loss: 100cc  IVF: 1000cc LR]  UOP: 400cc    Findings: normal uterus (8wks size, mobile), tubes, and ovaries; right ovary with follicular appearing simple cyst    Complications: none      Procedure in Detail:  The pt was taken to the operating room where SCDs were placed and GETA was initiated. She was placed in dorsal lithotomy position in Cameron stirrups and received 2g ancef for preoperative antibiotics prior to incision. A arcos catheter was placed in the bladder.  A weighted speculum was placed and a right angle retractor used for adequate cervical exposure.  The anterior and posterior lips of the cervix were grasped with single tooth tenaculum.  Dilute epinephrine in lidocaine was used to infiltrate circumferentially around the cervix under the vaginal mucosa.  A circumferential incision was made around the cervix with the bovie.  The posterior cul-de-sac was entered with the Alfred scissors and pickups with teeth a long weighted speculum was inserted into the peritoneal cavity.  The posterior  peritoneum was tagged to the posterior cuff with a figure of 8 of 0-vicryl.  The uterosacral ligaments were bilaterally clamped with Mague clamps, transected with alfred scissors and transfixed with 0-vicryl.  These were tagged with hemostats for identification and manipulation later in the case.  The bladder was dissected off of the anterior cervix with Metzenbaum scissors.  The anterior cul-de-sac could not yet easily be entered so additional bites were taken bilaterally with Mague clamps, cut with Alfred scissors and suture ligated, ensuring the bladder was above the level of operation.  The anterior peritoneal reflection could then be identified and was entered with Metzenbaum scissors.  Intraperitoneal entry was assured and a right angle retractor was placed into the anterior cul-de-sac below the bladder.  Mague clamps were used then to serially clamp, cut with Alfred scissors and suture ligated weth 0-vicryl bilaterally up the cardinal and broad ligaments until the utero-ovarian ligaments were reached  The utero-ovarian ligaments were bilaterally clamped with Mague clamps and the uterus and cervix amputated with Alfred scissors.  The utero-ovarian pedicales were double suture ligated with 0-vicryl with transfixion stitch.  The fallopian tubes were noted to be easily accessible so the decision was made not to proceed with salpingectomy.  2 small areas were noted to be bleeding on the right sidewall.  1 are was clamped with right angle clamps and the area suture ligated with 0-vicryl.  A lower area was oversewn with 2-0 vicryl in a figure of 12 with resulting hemostasis noted.  All pedicles were examined and noted to be hemostatic.  The long weighted speculum was replaced with a short weighted speculum.  The bilateral vagina was sutured to the uterosacral ligament for apical support with 0-vicryl.  The inferior bladder edge above the cuff was noted to have no active bleeding but torn peritoneal edges were noted to  have small oozing.  Surgiflo with thrombin was placed over this area.  The cuff was closed vertically with 0-vicryl in running, locked fashion.  Hemostasis was noted.   Attention was then turned to Dr. Villanueva's portion of the surgery.  Cystoscopy did reveal bilateral vigorous ureteral efflux of urine.  Please see his documentation for details regarding TVT placement.  The arcos catheter was reinserted.  The vagina was irrigated and hemostasis was noted. The pt was awakened from GETA and taken to recovery in good condition.  All counts were correct, there were no complications.     Yesika White MD  [facility]

## 2022-08-25 NOTE — ANESTHESIA TIME REPORT
Anesthesia Start and Stop Event Times     Date Time Event    8/25/2022 0726 Ready for Procedure     0742 Anesthesia Start     1023 Anesthesia Stop        Responsible Staff  08/25/22    Name Role Begin End    José Miguel Gilmore M.D. Anesth 0742 1023        Overtime Reason:  no overtime (within assigned shift)    Comments:

## 2022-08-25 NOTE — OR NURSING
1005  Patient arrived from OR. ID verified report received. 6L 02 mask to OPA. Respirations even and non-labored. No distress noted. X2 puncture sites covered with [place] bond. Gamez cath in place. Vital signs stable.   1023 handoff to Yin ESCALERA

## 2022-08-25 NOTE — ANESTHESIA PREPROCEDURE EVALUATION
Case: [id number] Date/Time: 08/25/22 0715    Procedures:       TOTAL VAGINAL HYSTERECTOMY, BILATERAL SALPINGECTOMY      SALPINGECTOMY      RETROPUBIC MID URETHRAL SLING, CYSTOURETHROSCOPY AND ALL INDICATED PROCEDURES      CYSTOSCOPY    Pre-op diagnosis: ABNORMAL UTERINE BLEEDING, STRESS URINARY INCONTINENCE    Location: [facility] ROOM 27 / SURGERY SAME DAY [facility]    Surgeons: Yesika White M.D.; Julian Villanueva M.D.          Relevant Problems   No relevant active problems     Anes H&P:  PAST MEDICAL HISTORY:   37 y.o. female who presents for Procedure(s):  TOTAL VAGINAL HYSTERECTOMY, BILATERAL SALPINGECTOMY  SALPINGECTOMY  RETROPUBIC MID URETHRAL SLING, CYSTOURETHROSCOPY AND ALL INDICATED PROCEDURES  CYSTOSCOPY.  She has current and past medical problems significant for:    Past Medical History:   Diagnosis Date   • Arrhythmia     afib   • Asthma        SMOKING/ALCOHOL/RECREATIONAL DRUG USE:  Social History     Tobacco Use   • Smoking status: Never   • Smokeless tobacco: Never   Vaping Use   • Vaping Use: Never used   Substance Use Topics   • Alcohol use: Yes   • Drug use: Never     Social History     Substance and Sexual Activity   Drug Use Never       PAST SURGICAL HISTORY:  Past Surgical History:   Procedure Laterality Date   • OTHER CARDIAC SURGERY N/A     cardiac ablation       ALLERGIES:   Allergies   Allergen Reactions   • Hydrocodone Rash     rash   • Penicillins        MEDICATIONS:  No current facility-administered medications on file prior to encounter.     Current Outpatient Medications on File Prior to Encounter   Medication Sig Dispense Refill   • albuterol 108 (90 Base) MCG/ACT Aero Soln inhalation aerosol Inhale 2 Puffs every 6 hours as needed for Shortness of Breath.     • fluticasone-salmeterol (ADVAIR) 250-50 MCG/ACT AEROSOL POWDER, BREATH ACTIVATED Inhale 1 Puff 2 times a day.         LABS:  Lab Results   Component Value Date/Time    HEMOGLOBIN 13.1 08/15/2022 1008    HEMATOCRIT 39.9 08/15/2022  1008    WBC 8.8 08/15/2022 1008     Lab Results   Component Value Date/Time    SODIUM 138 08/15/2022 1008    POTASSIUM 4.0 08/15/2022 1008    CHLORIDE 102 08/15/2022 1008    CO2 24 08/15/2022 1008    GLUCOSE 108 (H) 08/15/2022 1008    BUN 12 08/15/2022 1008    CALCIUM 8.8 08/15/2022 1008         PREVIOUS ANESTHETICS: See EMR  __________________________________________      Physical Exam    Airway   Mallampati: III  TM distance: >3 FB  Neck ROM: full       Cardiovascular - normal exam  Rhythm: regular  Rate: normal  (-) murmur     Dental - normal exam           Pulmonary - normal exam  Breath sounds clear to auscultation     Abdominal   (+) obese     Neurological - normal exam                 Anesthesia Plan    ASA 3   ASA physical status 3 criteria: morbid obesity - BMI greater than or equal to 40    Plan - general       Airway plan will be ETT          Induction: intravenous    Postoperative Plan: Postoperative administration of opioids is intended.    Pertinent diagnostic labs and testing reviewed    Informed Consent:    Anesthetic plan and risks discussed with patient and spouse.    Use of blood products discussed with: patient and spouse whom consented to blood products.

## 2022-08-25 NOTE — OP REPORT
OPERATIVE REPORT     Name: Sherri Martinez    : 1985    MRN: [id number]       PRE-OP DIAGNOSIS:   Stress urinary incontinence            POST-OP DIAGNOSIS:   Stress urinary incontinence            PROCEDURE:    - Retropubic midurethral sling, cystoscopy (39842)   - Total vaginal hysterectomy performed by Dr. White, which I assisted ([id number])            SURGEON:   Julian Villanueva MD - Primary  Yesika White MD - Assistant            ANESTHESIA: General endotracheal            FINDINGS:    - Cystourethroscopy: Normal appearing urothelium without lesions, masses, sutures, or perforations. Ureteral orifices noted in the normal orthotopic position, with brisk jets of urine bilaterally after sling and hysterectomy were completed. Urethra was normal in appearance without evidence of stricture, perforation, or diverticulum.        ESTIMATED BLOOD LOSS: 15 mL from sling procedure            DRAINS: Gamez                   SPECIMENS: None            IMPLANTS:   Implant Name Type Inv. Item Serial No.  Lot No. LRB No. Used Action   SYSTEM TRANSVAGINAL MID-URETHRAL SLING ADVANTAGE FIT Bladder Sling SYSTEM TRANSVAGINAL MID-URETHRAL SLING ADVANTAGE FIT  [employer] [id number] N/A 1 Implanted               COMPLICATIONS: None            DISPOSITION: Discharge            CONDITION: Stable            INDICATION FOR SURGERY:     Sherri Martinez is a 37 y.o. year old P3 with stress urinary incontinence.  I discussed options for management which include both nonsurgical and surgical options. Non-surgical pelvic floor physical therapy and pessary use. She is interested in definitive surgical therapy.  I reviewed all possible therapies for stress urinary incontinence with her including retropubic mid urethral sling, urethral bulking, fascial sling, Oneal colposuspension.  I was able to demonstrate stress urinary incontinence upon bladder backfill, and she had a normal postvoid residual volume.  I  feel comfortable moving forward with sling procedure without further urodynamic work-up. Given her predominant urethral hypermobility, as well as her BMI, the likely best solution for her long-term would be a retropubic mid urethral sling.  Bulking may be a good option as well, but is not treating the underlying cause and is been shown to be less effective in obese patients. She opts to proceed with: Retropubic mid urethral sling, cystourethroscopy, and all indicated procedures to be scheduled in conjunction with hysterectomy with Dr. Negrete.  She was given printed counseling as well as verbal counseling on the risk, benefits, and alternatives of this procedure including bleeding, infection, damage to surrounding organs, 1 to 5% risk of mesh exposure over time, need for mesh removal, risk of persistent urinary incontinence, risk of transient urinary retention, risk of persistent urinary retention requiring sling loosening or revision.  All questions were answered and consent was signed and witnessed.       TECHNIQUE:    I spoke with the patient in the preoperative holding area, where again risks, benefits, indications, and alternatives were reviewed and informed consent was obtained.  She was then transferred to the operative suite, where she was identified, procedure was confirmed.  She was placed in dorsal supine position, given general endotracheal anesthesia without difficulty.  She was then placed in a high dorsal lithotomy position  in yellowfin stirrups with all pressure points padded.  She was prepared and draped in usual sterile fashion with vaginal chlorhexidine prep with all pressure points padded.  An appropriate time-out was performed, where patient was identified, procedure was confirmed, and the operative team was introduced.  It was also confirmed that patient did receive cefazolin 2 g IV for prophylaxis, and she also received DVT prophylaxis with sequential compression devices bilaterally throughout  the case.   A 16-Yoruba Gamez catheter was then placed in the patient's  bladder for drainage throughout the procedure, and a [place] retractor was placed for retraction.     The vaginal hysterectomy then proceeded, performed by Dr. White, with my assisting. See separate dictation.     After closure of the cuff, which was hemostatic, the retropubic mid urethral sling procedure then proceeded in the following fashion: The Gamez catheter was placed under traction and the bladder neck was delineated at the level of the Gamez bulb.  Allis clamps used to delineate the mid urethra which was then injected with a solution of 1% lidocaine with dilute epinephrine.  A scalpel was then used to make an incision vertically over the urethra.  Sharp and blunt dissection was then used to dissect the vaginal epithelium away from the underlying pubocervical fascia to the level of the inferior right pubic ramus.  This dissection was then repeated on the contralateral side.  Excellent hemostasis was noted.  Attention then turned the suprapubic area where approximately 1 fingerbreadth above the pubic symphysis and 2 cm lateral bilaterally, markings were made for the exit sites of the trochars.  The [place] [employer]vantage retropubic sling trochars were then passed underneath the pubic ramus and up through the anterior abdominal wall bilaterally and held.  Cystourethroscopy was then performed.  The Gamez catheter was removed and the 70 degree cystoscope was advanced into the bladder which was backfilled with sterile saline.  A 360 degree survey was performed which noted normal roll of the TVT trocars behind the bladder wall with no visualized trocar or mesh.  There was also bilateral ureteral efflux of Pyridium stained urine. No urethral perforations or mesh were visualized.  The cystoscope was removed and the bladder drained of all cystoscopic fluid and the Gamez catheter was replaced.  The sling mesh was then tensioned  against an 8 Hegar and cut at the anterior abdominal wall, with skin incisions covered with Dermabond.  Another exam noted that there was no perforation of mesh into the vaginal epithelium, and the sling was in place at the miduretrha without tension.  The vaginal epithelium was then closed with a 2-0 Vicryl running locked suture followed by 2 interrupted 2-0 Vicryl sutures with excellent hemostasis noted.       All counts were correct.  The patient was then wakened from general anesthesia easily, and brought to the PACU in stable condition. Anticipate discharge home today.         Julian Villanueva MD, FACOG  Female Pelvic Medicine and Reconstructive Surgery  Department of Obstetrics and Gynecology  [facility]

## 2022-08-25 NOTE — OR NURSING
1230 - Pt stable to discharge.  Instructions given, patient and  verbalize understanding.  IV And armbands removed.  Taken via wheelchair to car.  Pt has all belongings with them. Extra pads and warm packs given to patient to take home.

## 2022-08-25 NOTE — DISCHARGE INSTRUCTIONS
Post-op: What to expect after your surgery    For urgent post-operative questions or concerns regarding your bladder emptying, please call Dr. Villanueva's direct on-call cell line at [phone], otherwise call Dr. Negrete.    For less-urgent matters (Monday - Friday), you may send a message through [employer] or call the general Women's Health line at [phone].     Bladder function  Try to empty your bladder (urinate) at regular intervals by sitting on the toilet and relaxing.  You may need to adjust your positioning (lean forward or back) to empty the bladder fully. It is important that you do not push or strain to empty your bladder.     Call the surgeon at the number above if you cannot urinate. Also, call for treatment if you have signs and symptoms of a urinary tract infection, including:   Burning with urination  Bladder pain  Worsening need to urinate right away  Urine with a bad smell    If you are sent home with a catheter:   Empty the catheter bag when it becomes full. The bag should be kept at a level below your hips to drain properly. When you are asleep, the bag should dangle off the bed. and should dangle off of the bed while you are asleep. You will be called the day after you go home to schedule an office visit to test your bladder and remove the catheter.     Vaginal care:   Do not go swimming, take sitting baths, or have sexual intercourse for 6 weeks after surgery. Do not place anything in the vagina except vaginal estrogen cream, if instructed to do so by your surgeon.     Vaginal discharge and bleeding/spotting is also normal through the entire 6-week recovery. Sometimes small sutures will fall out of the vagina as they dissolve. This is normal. Contact the office with any heavy bleeding soaking through pads, bad-smelling discharge, or worsening pain.     Pain management:  Surgical pain is controlled in most patients with only non-steroidal anti-inflammatory drugs (NSAIDs, such as ibuprofen,  “Advil”), and acetaminophen (Tylenol). These drugs can be taken together without interaction. In the hospital, your nurse will give you these medications at regular intervals to both treat and to prevent pain. If these are not controlling your pain, you may ask the nurse for additional medication. When you go home, you will also take NSAIDs and acetaminophen for pain management. Sometimes, a short course of narcotics such as oxycodone and hydromorphone is are required. We do not recommend using narcotics regularly as it can lead to constipation or dizziness, and falls.     Do not drive or operate heavy machinery while using narcotics. You are unlikely to become addicted if you need to take a narcotic medication a few times within the first week of your surgery.  After the first few days to one week, your pain should decrease and you should not have pain severe enough to need narcotics. If you continue having severe pain, contact your surgeon for re-evaluation.     Incision care  The small incisions above your pubic bone or in the groin, will be closed with either small bandages or a surgical glue. You can shower with these in place. In the shower, allow soapy warm water to run over the incisions. Do not scrub or wipe your incisions. Keep the incisions dry for the remainder of the day/night. The bandages will fall off on their own, or you can remove them after at least 3 days if they become discolored or dirty. The glue will also fall off on its own after a few weeks. Call us if there is any increasing incisional pain, redness, warmth or discharge.      Activity restrictions  During the first 6 weeks you should avoid any type of heavy lifting that requires straining.  Gentle walking is good exercise. Start with about 10 minutes a day when you feel ready and build up gradually. Avoid repetitive squatting or bending at the waist. Avoid any fitness-type training, aerobics, etc. for at least 6 weeks after surgery.  Generally, you will need a few days off work. This period may be longer if you have a very physical job or if specifically recommended by your surgeon.     Return to sex  When your surgeon clears you to resume sex (if desired), begin slowly and to use enough lubrication to help ease the discomfort. Because your vagina and pelvis have been re-structured, and it can take time to get used to sex after surgery. As scar tissue softens over time you will feel less discomfort. If discomfort does not go away, contact the office to schedule an exam and to discuss other options. In some cases, your surgeon may prescribe a low dose of vaginal estrogen to help with vaginal dryness and pain that may happen with sex.    HOME CARE INSTRUCTIONS    ACTIVITY: Rest and take it easy for the first 24 hours.  A responsible adult is recommended to remain with you during that time.  It is normal to feel sleepy.  We encourage you to not do anything that requires balance, judgment or coordination.    FOR 24 HOURS DO NOT:  Drive, operate machinery or run household appliances.  Drink beer or alcoholic beverages.  Make important decisions or sign legal documents.    SPECIAL INSTRUCTIONS: see above instructions.    DIET: To avoid nausea, slowly advance diet as tolerated, avoiding spicy or greasy foods for the first day.  Add more substantial food to your diet according to your physician's instructions.  Babies can be fed formula or breast milk as soon as they are hungry.  INCREASE FLUIDS AND FIBER TO AVOID CONSTIPATION.    SURGICAL DRESSING/BATHING: see above instructions.    MEDICATIONS: Resume taking daily medication.  Take prescribed pain medication with food.  If no medication is prescribed, you may take non-aspirin pain medication if needed.  PAIN MEDICATION CAN BE VERY CONSTIPATING.  Take a stool softener or laxative such as senokot, pericolace, or milk of magnesia if needed.    Prescription given for Oxycodone, Tylenol and Ibuprofen.   Last pain medication given at _________.    A follow-up appointment should be arranged with your doctor; call to schedule if appointment not already made.     You should CALL YOUR PHYSICIAN if you develop:  Fever greater than 101 degrees F.  Pain not relieved by medication, or persistent nausea or vomiting.  Excessive bleeding (blood soaking through dressing) or unexpected drainage from the wound.  Extreme redness or swelling around the incision site, drainage of pus or foul smelling drainage.  Inability to urinate or empty your bladder within 8 hours.  Problems with breathing or chest pain.    You should call 911 if you develop problems with breathing or chest pain.  If you are unable to contact your doctor or surgical center, you should go to the nearest emergency room or urgent care center.  Physician's telephone #: Dr. Michel [phone]. For any issues/concerns regarding urination, please contact Dr. Villanueva.     MILD FLU-LIKE SYMPTOMS ARE NORMAL.  YOU MAY EXPERIENCE GENERALIZED MUSCLE ACHES, THROAT IRRITATION, HEADACHE AND/OR SOME NAUSEA.    If any questions arise, call your doctor.  If your doctor is not available, please feel free to call the Surgical Center at [phone].  The Center is open Monday through Friday from 7AM to 7PM.      A registered nurse may call you a few days after your surgery to see how you are doing after your procedure.    You may also receive a survey in the mail within the next two weeks and we ask that you take a few moments to complete the survey and return it to us.  Our goal is to provide you with very good care and we value your comments.     Depression / Suicide Risk    As you are discharged from this [facility] facility, it is important to learn how to keep safe from harming yourself.    Recognize the warning signs:  Abrupt changes in personality, positive or negative- including increase in energy   Giving away possessions  Change in eating patterns- significant  weight changes-  positive or negative  Change in sleeping patterns- unable to sleep or sleeping all the time   Unwillingness or inability to communicate  Depression  Unusual sadness, discouragement and loneliness  Talk of wanting to die  Neglect of personal appearance   Rebelliousness- reckless behavior  Withdrawal from people/activities they love  Confusion- inability to concentrate     If you or a loved one observes any of these behaviors or has concerns about self-harm, here's what you can do:  Talk about it- your feelings and reasons for harming yourself  Remove any means that you might use to hurt yourself (examples: pills, rope, extension cords, firearm)  Get professional help from the community (Mental Health, Substance Abuse, psychological counseling)  Do not be alone:Call your Safe Contact- someone whom you trust who will be there for you.  Call your local CRISIS HOTLINE 111-9992 or 067-239-9857  Call your local Children's Mobile Crisis Response Team [place] (362) 940-1356 or www.[employer]  Call the toll free National Suicide Prevention Hotlines   National Suicide Prevention Lifeline 538-785-POCP (3347)  National Hope Line Network 800-SUICIDE (825-3924)    I acknowledge receipt and understanding of these Home Care instructions.

## 2022-10-04 ENCOUNTER — GYNECOLOGY VISIT (OUTPATIENT)
Dept: OBGYN | Facility: CLINIC | Age: 37
End: 2022-10-04
Payer: COMMERCIAL

## 2022-10-04 VITALS
HEART RATE: 84 BPM | WEIGHT: 227 LBS | BODY MASS INDEX: 41.52 KG/M2 | SYSTOLIC BLOOD PRESSURE: 144 MMHG | DIASTOLIC BLOOD PRESSURE: 85 MMHG

## 2022-10-04 DIAGNOSIS — Z98.890 POSTOPERATIVE STATE: ICD-10-CM

## 2022-10-04 PROBLEM — N94.6 DYSMENORRHEA: Status: RESOLVED | Noted: 2022-08-25 | Resolved: 2022-10-04

## 2022-10-04 PROBLEM — N39.3 SUI (STRESS URINARY INCONTINENCE, FEMALE): Status: RESOLVED | Noted: 2022-07-21 | Resolved: 2022-10-04

## 2022-10-04 PROBLEM — N93.9 ABNORMAL UTERINE BLEEDING (AUB): Status: RESOLVED | Noted: 2022-08-25 | Resolved: 2022-10-04

## 2022-10-04 PROCEDURE — 99024 POSTOP FOLLOW-UP VISIT: CPT | Performed by: STUDENT IN AN ORGANIZED HEALTH CARE EDUCATION/TRAINING PROGRAM

## 2022-10-04 NOTE — PROGRESS NOTES
Patient here for Post Op Exam  Surgery Date: 8/25/2022  PT States no complications or concerns   PVR : 9 mL  Good #: [phone] (home)   Pharmacy Verified

## 2022-10-04 NOTE — PROGRESS NOTES
Urogynecology and Pelvic Reconstructive Surgery Follow Up    Sherri Martinez MRN:[id number] :1985    Referred by: Yesika Coon MD    Reason for Visit:   Chief Complaint   Patient presents with    Other     Post Op          Subjective     History of Presenting Illness:    Ms.Melissa Martinez is a 37 y.o. year old P3 with history of abnormal uterine bleeding and stress urinary incontinence who presents for postoperative follow-up after vaginal hysterectomy (performed by Dr. Negrete), and retropubic mid urethral sling procedure (performed by me).    She reports having an easier recovery with minimal pain.  She has minimal vaginal bleeding or discharge.  Her urinary incontinence has significantly improved and she rarely has any stress leakage is unless she holds her bladder for very long time or not significant pressure.  Quality of life significantly improved and she is happy with outcomes.      Initial HPI: She was referred by her GYN Dr. Coon for the evaluation and management of urinary incontinence.     She is working with Dr. Negrete on management of her heavy menstrual bleeding, and is planned for laparoscopic hysterectomy, but she also has significant urinary incontinence would like to have this treated at the same time if possible.    She is bothered for some time by urinary leakage, mostly with cough, laugh, exercise.  This is moderate volume and she has to use pads throughout the day.  This started with the birth of her children, but is worsening.    Prior Pelvic surgery:   2022: Retropubic mid urethral sling ([facility]), total vaginal hysterectomy ([place])     Prior treatment:   none     Fluid intake:   3 cups water  1 cup coffee    Pelvic floor symptom review:     Bladder:   Voids per day: 7 Voids per night: 1      Urinary incontinence episodes per day: 4-5 -->0-1   Urge leakage:  On Movement to Bathroom --> none   Stress leakage: With Cough, With Laugh and With Exercise --> uncommon, with very full  bladder   Continuous / insensible urine loss: No    Nocturnal enuresis: No    Leakage volume: Moderate   Number of pads/day: 1-2    Bladder emptying: Incomplete --> complete, slower voiding   Voiding symptoms: None   UTI in last 12 months: No   Other urologic history: none      Prolapse:     Prolapse symptoms: None      Bowel:    No bowel issues      Sexual function:    Sexually active: Yes   Gender of partners: Male   Pain with intercourse: No       Pelvic Pain: No      Past medical and surgical history    Past obstetric history   Number of vaginal deliveries: 3   Number of  deliveries: 0   History of vacuum/forceps: No    History of obstetric anal sphincter injury: No     Past medical history:  Past Medical History:   Diagnosis Date    Arrhythmia     afib    Asthma      Past surgical history:  Past Surgical History:   Procedure Laterality Date    UT CYSTOURETHROSCOPY N/A 2022    Procedure: CYSTOSCOPY;  Surgeon: Julian Villanueva M.D.;  Location: SURGERY SAME DAY [facility];  Service: Gynecology    VAGINAL HYSTERECTOMY TOTAL N/A 2022    Procedure: TOTAL VAGINAL HYSTERECTOMY;  Surgeon: Yesika White M.D.;  Location: SURGERY SAME DAY [facility];  Service: Gynecology    BLADDER SLING FEMALE N/A 2022    Procedure: RETROPUBIC MID URETHRAL SLING, CYSTOURETHROSCOPY AND ALL INDICATED PROCEDURES;  Surgeon: Julian Villanueva M.D.;  Location: SURGERY SAME DAY [facility];  Service: Gynecology    OTHER CARDIAC SURGERY N/A     cardiac ablation     Medications:has a current medication list which includes the following prescription(s): ibuprofen, acetaminophen, albuterol, and fluticasone-salmeterol.  Allergies:Hydrocodone and Penicillins  Family history:  Family History   Problem Relation Age of Onset    Blood Disease Maternal Aunt      Social history: reports that she has never smoked. She has never used smokeless tobacco. She reports current alcohol use. She reports that she does not use drugs.    Review of  systems: A full review of systems was performed, and negative with the exception of want is noted above in the HPI.        Objective        BP (!) 144/85 (BP Location: Left arm, Patient Position: Sitting, BP Cuff Size: Large adult)   Pulse 84   Wt 227 lb   BMI 41.52 kg/m²     Physical Exam  Vitals reviewed. Exam conducted with a chaperone present (MA - see notes.).   Constitutional:       Appearance: Normal appearance. She is obese.   HENT:      Head: Normocephalic.      Mouth/Throat:      Mouth: Mucous membranes are moist.   Cardiovascular:      Rate and Rhythm: Normal rate.   Pulmonary:      Effort: Pulmonary effort is normal.   Abdominal:      Palpations: Abdomen is soft. There is no mass.      Tenderness: There is no abdominal tenderness.   Skin:     General: Skin is warm and dry.   Neurological:      Mental Status: She is alert.   Psychiatric:         Mood and Affect: Mood normal.       Genitourinary:    External female genitalia: WNL   Vulva: WNL   Bulbocavernosus reflex: Intact   Anal wink reflex: Intact   Perineal sensation: WNL   Urethra: Hypermobility resolved, sling incision well-healed without any exposed mesh palpable or visible   Vagina: WNL -all suture lines including the cuff are well-healed, although some Vicryl suture still present.   Atrophy: None   Cough stress test: Negative    Pelvic floor:    POP-Q: no significant prolapse    Urethral tenderness: No    Bladder/ suprapubic tenderness: No    Levator tenderness: None   Levator muscle tone: Hypotonic   Pelvic floor contraction strength (modified Oxford scale): 1=Flicker   Pelvic floor contraction duration: Brief       Diagnostic test and records review:       Post-void residual: (Postop) 9 mL, performed by Bladder Scanner    Labs:     Radiology: n/a    Documentation reviewed: Prior EMR Records    Outside records reviewed: 7 pages           Assessment & Plan     Ms.Melissa Martinez is a 37 y.o. year old P3 with stress urinary incontinence, now  status post mid urethral sling with significant symptomatic improvement    1. CONRADO (stress urinary incontinence, female)  - Status post retropubic mid urethral sling procedure, significant provement with near resolution of stress incontinence.  - May return to all normal activities  - Wait least 2 more weeks before intercourse in order to protect cuff and urethral incisions.  - If any further urinary symptoms develop I encouraged her to call my office and we can manage this appropriately    Follow-up in 3 months or as needed               Julian Villanueva MD, FACOG    Female Pelvic Medicine and Reconstructive Surgery  Department of Obstetrics and Gynecology  [facility]  [facility]    CC: Shabnam Negrete    This medical record contains text that has been entered with the assistance of computer voice recognition and dictation software.  Therefore, it may contain unintended errors in text, spelling, punctuation, or grammar

## (undated) DEVICE — PAD BABY LAP 4X18 W/O - RINGS PREWASHED 5/PK 40PK/CS

## (undated) DEVICE — LACTATED RINGERS INJ 1000 ML - (14EA/CA 60CA/PF)

## (undated) DEVICE — SUTURE 0 VICRYL PLUS CT-1 - 36 INCH (36/BX)

## (undated) DEVICE — JELLY SURGILUBE STERILE TUBE 4.25 OZ (1/EA)

## (undated) DEVICE — SLEEVE VASO CALF MED - (10PR/CA)

## (undated) DEVICE — PENCIL ELECTSURG 10FT BTN SWH - (50/CA)

## (undated) DEVICE — GLOVE BIOGEL INDICATOR SZ 6 SURGICAL PF LTX -(50/BX)

## (undated) DEVICE — SENSOR OXIMETER ADULT SPO2 RD SET (20EA/BX)

## (undated) DEVICE — SPONGE XRAY 8X4 STERL. 12PL - (10EA/TY 80TY/CA)

## (undated) DEVICE — SUTURE 0 VICRYL PLUS CT-2 - 8 X 18 INCH (12/BX)

## (undated) DEVICE — GLOVE BIOGEL SZ 7 SURGICAL PF LTX - (50PR/BX 4BX/CA)

## (undated) DEVICE — TUBING CLEARLINK DUO-VENT - C-FLO (48EA/CA)

## (undated) DEVICE — DRAPE UNDER BUTTOCK LRG (40/CA)

## (undated) DEVICE — SET LEADWIRE 5 LEAD BEDSIDE DISPOSABLE ECG (1SET OF 5/EA)

## (undated) DEVICE — SCRUB SOLUTION EXIDINE 4% 40Z - 48/CS CHLORAHEXADINE GLUCONATE

## (undated) DEVICE — SET IRRIGATION CYSTOSCOPY TUBE L80 IN (20EA/CA)

## (undated) DEVICE — SUTURE 0 VICRYL PLUS CT-1 - 8 X 18 INCH (12/BX)

## (undated) DEVICE — CANISTER SUCTION 3000ML MECHANICAL FILTER AUTO SHUTOFF MEDI-VAC NONSTERILE LF DISP  (40EA/CA)

## (undated) DEVICE — DERMABOND ADVANCED - (12EA/BX)

## (undated) DEVICE — ELECTRODE DUAL RETURN W/ CORD - (50/PK)

## (undated) DEVICE — WATER IRRIG. STER 3000 ML - (4/CA)

## (undated) DEVICE — MASK OXYGEN VNYL ADLT MED CONC WITH 7 FOOT TUBING  - (50EA/CA)

## (undated) DEVICE — SUTURE 2-0 VICRYL PLUS SH - 27 INCH (36/BX)

## (undated) DEVICE — COVER MAYO STAND X-LG - (22EA/CA)

## (undated) DEVICE — GLOVE BIOGEL SZ 6 PF LATEX - (50EA/BX 4BX/CA)

## (undated) DEVICE — KIT SURGIFLO W/OUT THROMBIN - (6EA/CA)

## (undated) DEVICE — SUCTION INSTRUMENT YANKAUER BULBOUS TIP W/O VENT (50EA/CA)

## (undated) DEVICE — CANISTER SUCTION RIGID RED 1500CC (40EA/CA)

## (undated) DEVICE — SUTURE 2-0 VICRYL PLUS CT-1 36 (36PK/BX)"

## (undated) DEVICE — DRAPE UNDER BUTTOCKS FLUID - (20/CA)

## (undated) DEVICE — TRAY FOLEY CATHETER STATLOCK 16FR SURESTEP  (10EA/CA)

## (undated) DEVICE — Device

## (undated) DEVICE — TUBE CONNECTING SUCTION - CLEAR PLASTIC STERILE 72 IN (50EA/CA)

## (undated) DEVICE — SUTURE 0 COATED VICRYL 6-18IN - (12PK/BX)

## (undated) DEVICE — GOWN WARMING STANDARD FLEX - (30/CA)

## (undated) DEVICE — CATHETER IV 20 GA X 1-1/4 ---SURG.& SDS ONLY--- (50EA/BX)

## (undated) DEVICE — SUTURE GENERAL

## (undated) DEVICE — TRAY SRGPRP PVP IOD WT PRP - (20/CA)

## (undated) DEVICE — PAD SANITARY 11IN MAXI IND WRAPPED  (12EA/PK 24PK/CA)

## (undated) DEVICE — RINGDISP RETRACTOR LONESTAR

## (undated) DEVICE — TOWEL STOP TIMEOUT SAFETY FLAG (40EA/CA)

## (undated) DEVICE — SODIUM CHL IRRIGATION 0.9% 1000ML (12EA/CA)

## (undated) DEVICE — CANNULA W/ SUPPLY TUBING O2 - (50/CA)

## (undated) DEVICE — GLOVE BIOGEL INDICATOR SZ 7.5 SURGICAL PF LTX - (50PR/BX 4BX/CA)

## (undated) DEVICE — KIT  I.V. START (100EA/CA)

## (undated) DEVICE — WATER IRRIGATION STERILE 1000ML (12EA/CA)